# Patient Record
Sex: FEMALE | Race: OTHER | Employment: FULL TIME | URBAN - METROPOLITAN AREA
[De-identification: names, ages, dates, MRNs, and addresses within clinical notes are randomized per-mention and may not be internally consistent; named-entity substitution may affect disease eponyms.]

---

## 2017-03-20 ENCOUNTER — OFFICE VISIT (OUTPATIENT)
Dept: OBGYN CLINIC | Facility: CLINIC | Age: 57
End: 2017-03-20

## 2017-03-20 VITALS
HEIGHT: 64 IN | DIASTOLIC BLOOD PRESSURE: 74 MMHG | BODY MASS INDEX: 42 KG/M2 | WEIGHT: 246 LBS | SYSTOLIC BLOOD PRESSURE: 124 MMHG

## 2017-03-20 DIAGNOSIS — Z01.419 WOMEN'S ANNUAL ROUTINE GYNECOLOGICAL EXAMINATION: Primary | ICD-10-CM

## 2017-03-20 PROCEDURE — 87624 HPV HI-RISK TYP POOLED RSLT: CPT | Performed by: OBSTETRICS & GYNECOLOGY

## 2017-03-20 PROCEDURE — 99203 OFFICE O/P NEW LOW 30 MIN: CPT | Performed by: OBSTETRICS & GYNECOLOGY

## 2017-03-20 PROCEDURE — 88175 CYTOPATH C/V AUTO FLUID REDO: CPT | Performed by: OBSTETRICS & GYNECOLOGY

## 2017-03-20 RX ORDER — MELOXICAM 7.5 MG/1
7.5 TABLET ORAL
Refills: 11 | COMMUNITY
Start: 2017-02-26

## 2017-03-21 PROBLEM — O99.210 OBESITY AFFECTING PREGNANCY, ANTEPARTUM: Status: ACTIVE | Noted: 2017-03-21

## 2017-03-21 PROBLEM — E66.9 OBESITY: Status: RESOLVED | Noted: 2017-03-21 | Resolved: 2017-03-21

## 2017-03-21 PROBLEM — M19.90 ARTHRITIS: Status: ACTIVE | Noted: 2017-03-21

## 2017-03-21 PROBLEM — E66.9 OBESITY: Status: ACTIVE | Noted: 2017-03-21

## 2017-03-21 NOTE — H&P
Wendy Cassidy is here for a checkup. I have not seen her for about 7 years. She is a 44-year-old  female  2 para 0020. Patient has been menopausal since age 54 she was never on hormone replacement therapy.   She denies any allergies she has not h History  None on file     Social History Main Topics   Smoking status: Never Smoker     Smokeless tobacco: Not on file    Alcohol Use: Yes    Drug Use: No    Sexual Activity: Not on file   Not on file  Other Topics Concern   None on file     Social History

## 2017-03-22 LAB — HPV I/H RISK 1 DNA SPEC QL NAA+PROBE: NEGATIVE

## (undated) NOTE — MR AVS SNAPSHOT
[address] at [address], [place] [address]  [phone]               Thank you for choosing us for your health care visit with Dale Hodgkins, MD.  We are glad to serve you and happy to provide you with [address] [place] [address]    It is the patient's responsibility to check with and follow their insurance company's guidelines for prior authorization for this test.  You may be held responsible for payment in full if pro [employer] [facility] [address] [place], [place] [address] for making healthy food choices  -   Enjoy your food, but eat less. Fully enjoy your food when eating. Don’t eat while distracted and slow down. Avoid over sized portions. Don’t eat while when you’re bored.